# Patient Record
Sex: MALE | Race: WHITE | NOT HISPANIC OR LATINO | URBAN - METROPOLITAN AREA
[De-identification: names, ages, dates, MRNs, and addresses within clinical notes are randomized per-mention and may not be internally consistent; named-entity substitution may affect disease eponyms.]

---

## 2017-03-31 ENCOUNTER — RECORDS - HEALTHEAST (OUTPATIENT)
Dept: VASCULAR ULTRASOUND | Facility: CLINIC | Age: 77
End: 2017-03-31

## 2017-03-31 ENCOUNTER — OFFICE VISIT - HEALTHEAST (OUTPATIENT)
Dept: VASCULAR SURGERY | Facility: CLINIC | Age: 77
End: 2017-03-31

## 2017-03-31 ENCOUNTER — AMBULATORY - HEALTHEAST (OUTPATIENT)
Dept: VASCULAR SURGERY | Facility: CLINIC | Age: 77
End: 2017-03-31

## 2017-03-31 DIAGNOSIS — Z98.890 OTHER SPECIFIED POSTPROCEDURAL STATES: ICD-10-CM

## 2017-03-31 DIAGNOSIS — I65.23 OCCLUSION AND STENOSIS OF BILATERAL CAROTID ARTERIES: ICD-10-CM

## 2017-03-31 DIAGNOSIS — I65.23 CAROTID STENOSIS, BILATERAL: ICD-10-CM

## 2017-03-31 DIAGNOSIS — Z98.890 H/O CAROTID ENDARTERECTOMY: ICD-10-CM

## 2017-03-31 RX ORDER — METOPROLOL TARTRATE 25 MG/1
TABLET, FILM COATED ORAL
Refills: 0 | Status: SHIPPED | COMMUNITY
Start: 2017-02-16

## 2017-03-31 ASSESSMENT — MIFFLIN-ST. JEOR: SCORE: 1448.35

## 2018-03-01 ENCOUNTER — RECORDS - HEALTHEAST (OUTPATIENT)
Dept: LAB | Facility: CLINIC | Age: 78
End: 2018-03-01

## 2018-03-01 LAB
ANION GAP SERPL CALCULATED.3IONS-SCNC: 9 MMOL/L (ref 5–18)
BUN SERPL-MCNC: 17 MG/DL (ref 8–28)
CALCIUM SERPL-MCNC: 10.1 MG/DL (ref 8.5–10.5)
CHLORIDE BLD-SCNC: 103 MMOL/L (ref 98–107)
CHOLEST SERPL-MCNC: 194 MG/DL
CO2 SERPL-SCNC: 26 MMOL/L (ref 22–31)
CREAT SERPL-MCNC: 1.34 MG/DL (ref 0.7–1.3)
FASTING STATUS PATIENT QL REPORTED: NO
GFR SERPL CREATININE-BSD FRML MDRD: 52 ML/MIN/1.73M2
GLUCOSE BLD-MCNC: 107 MG/DL (ref 70–125)
HDLC SERPL-MCNC: 47 MG/DL
LDLC SERPL CALC-MCNC: 112 MG/DL
POTASSIUM BLD-SCNC: 5.2 MMOL/L (ref 3.5–5)
SODIUM SERPL-SCNC: 138 MMOL/L (ref 136–145)
TRIGL SERPL-MCNC: 177 MG/DL

## 2018-06-28 ENCOUNTER — HOME CARE/HOSPICE - HEALTHEAST (OUTPATIENT)
Dept: HOME HEALTH SERVICES | Facility: HOME HEALTH | Age: 78
End: 2018-06-28

## 2018-06-30 ENCOUNTER — HOME CARE/HOSPICE - HEALTHEAST (OUTPATIENT)
Dept: HOME HEALTH SERVICES | Facility: HOME HEALTH | Age: 78
End: 2018-06-30

## 2018-07-02 ENCOUNTER — RECORDS - HEALTHEAST (OUTPATIENT)
Dept: LAB | Facility: CLINIC | Age: 78
End: 2018-07-02

## 2018-07-02 LAB
ERYTHROCYTE [SEDIMENTATION RATE] IN BLOOD BY WESTERGREN METHOD: 3 MM/HR (ref 0–15)
TSH SERPL DL<=0.005 MIU/L-ACNC: 3.87 UIU/ML (ref 0.3–5)
VIT B12 SERPL-MCNC: 769 PG/ML (ref 213–816)

## 2018-07-03 LAB — 25(OH)D3 SERPL-MCNC: 31.2 NG/ML (ref 30–80)

## 2018-07-10 ENCOUNTER — OFFICE VISIT - HEALTHEAST (OUTPATIENT)
Dept: GERIATRICS | Facility: CLINIC | Age: 78
End: 2018-07-10

## 2018-07-10 DIAGNOSIS — J44.9 COPD (CHRONIC OBSTRUCTIVE PULMONARY DISEASE) (H): ICD-10-CM

## 2018-07-10 DIAGNOSIS — I63.9 CVA (CEREBRAL VASCULAR ACCIDENT) (H): ICD-10-CM

## 2018-07-10 DIAGNOSIS — I10 HTN (HYPERTENSION): ICD-10-CM

## 2018-07-10 DIAGNOSIS — R53.1 WEAKNESS: ICD-10-CM

## 2018-07-11 ENCOUNTER — OFFICE VISIT - HEALTHEAST (OUTPATIENT)
Dept: GERIATRICS | Facility: CLINIC | Age: 78
End: 2018-07-11

## 2018-07-11 ENCOUNTER — AMBULATORY - HEALTHEAST (OUTPATIENT)
Dept: ADMINISTRATIVE | Facility: CLINIC | Age: 78
End: 2018-07-11

## 2018-07-11 ENCOUNTER — RECORDS - HEALTHEAST (OUTPATIENT)
Dept: LAB | Facility: CLINIC | Age: 78
End: 2018-07-11

## 2018-07-11 DIAGNOSIS — I10 HTN (HYPERTENSION): ICD-10-CM

## 2018-07-11 DIAGNOSIS — I63.412 EMBOLIC STROKE INVOLVING LEFT MIDDLE CEREBRAL ARTERY (H): ICD-10-CM

## 2018-07-11 DIAGNOSIS — K59.00 CONSTIPATION: ICD-10-CM

## 2018-07-11 DIAGNOSIS — J44.9 COPD (CHRONIC OBSTRUCTIVE PULMONARY DISEASE) (H): ICD-10-CM

## 2018-07-11 DIAGNOSIS — Z71.89 ADVANCED DIRECTIVES, COUNSELING/DISCUSSION: ICD-10-CM

## 2018-07-11 RX ORDER — ATORVASTATIN CALCIUM 40 MG/1
40 TABLET, FILM COATED ORAL AT BEDTIME
Status: SHIPPED | COMMUNITY
Start: 2018-07-11

## 2018-07-11 RX ORDER — OXYMETAZOLINE HYDROCHLORIDE 0.05 G/100ML
SPRAY NASAL
Status: SHIPPED | COMMUNITY
Start: 2018-07-11

## 2018-07-11 RX ORDER — ACETAMINOPHEN 325 MG/1
650 TABLET ORAL EVERY 6 HOURS PRN
Status: SHIPPED | COMMUNITY
Start: 2018-07-11

## 2018-07-11 RX ORDER — CLOPIDOGREL BISULFATE 75 MG/1
75 TABLET ORAL DAILY
Status: SHIPPED | COMMUNITY
Start: 2018-07-11

## 2018-07-12 ENCOUNTER — OFFICE VISIT - HEALTHEAST (OUTPATIENT)
Dept: GERIATRICS | Facility: CLINIC | Age: 78
End: 2018-07-12

## 2018-07-12 DIAGNOSIS — I10 HTN (HYPERTENSION): ICD-10-CM

## 2018-07-12 DIAGNOSIS — J44.9 COPD (CHRONIC OBSTRUCTIVE PULMONARY DISEASE) (H): ICD-10-CM

## 2018-07-12 DIAGNOSIS — K59.00 CONSTIPATION: ICD-10-CM

## 2018-07-12 DIAGNOSIS — I63.412 EMBOLIC STROKE INVOLVING LEFT MIDDLE CEREBRAL ARTERY (H): ICD-10-CM

## 2018-07-12 LAB
ANION GAP SERPL CALCULATED.3IONS-SCNC: 10 MMOL/L (ref 5–18)
BUN SERPL-MCNC: 22 MG/DL (ref 8–28)
CALCIUM SERPL-MCNC: 9.3 MG/DL (ref 8.5–10.5)
CHLORIDE BLD-SCNC: 96 MMOL/L (ref 98–107)
CO2 SERPL-SCNC: 24 MMOL/L (ref 22–31)
CREAT SERPL-MCNC: 1.2 MG/DL (ref 0.7–1.3)
ERYTHROCYTE [DISTWIDTH] IN BLOOD BY AUTOMATED COUNT: 12 % (ref 11–14.5)
GFR SERPL CREATININE-BSD FRML MDRD: 59 ML/MIN/1.73M2
GLUCOSE BLD-MCNC: 105 MG/DL (ref 70–125)
HCT VFR BLD AUTO: 41.3 % (ref 40–54)
HGB BLD-MCNC: 14.3 G/DL (ref 14–18)
MAGNESIUM SERPL-MCNC: 2.1 MG/DL (ref 1.8–2.6)
MCH RBC QN AUTO: 31.6 PG (ref 27–34)
MCHC RBC AUTO-ENTMCNC: 34.6 G/DL (ref 32–36)
MCV RBC AUTO: 91 FL (ref 80–100)
PLATELET # BLD AUTO: 238 THOU/UL (ref 140–440)
PMV BLD AUTO: 9.7 FL (ref 8.5–12.5)
POTASSIUM BLD-SCNC: 3.6 MMOL/L (ref 3.5–5)
RBC # BLD AUTO: 4.53 MILL/UL (ref 4.4–6.2)
SODIUM SERPL-SCNC: 130 MMOL/L (ref 136–145)
WBC: 7.2 THOU/UL (ref 4–11)

## 2018-07-13 LAB — 25(OH)D3 SERPL-MCNC: 32.7 NG/ML (ref 30–80)

## 2018-07-17 ENCOUNTER — OFFICE VISIT - HEALTHEAST (OUTPATIENT)
Dept: GERIATRICS | Facility: CLINIC | Age: 78
End: 2018-07-17

## 2018-07-17 DIAGNOSIS — I10 HTN (HYPERTENSION): ICD-10-CM

## 2018-07-17 DIAGNOSIS — J44.9 COPD (CHRONIC OBSTRUCTIVE PULMONARY DISEASE) (H): ICD-10-CM

## 2018-07-17 DIAGNOSIS — I63.412 EMBOLIC STROKE INVOLVING LEFT MIDDLE CEREBRAL ARTERY (H): ICD-10-CM

## 2018-07-17 DIAGNOSIS — R53.1 WEAKNESS: ICD-10-CM

## 2018-07-19 ENCOUNTER — OFFICE VISIT - HEALTHEAST (OUTPATIENT)
Dept: GERIATRICS | Facility: CLINIC | Age: 78
End: 2018-07-19

## 2018-07-19 DIAGNOSIS — J44.9 COPD (CHRONIC OBSTRUCTIVE PULMONARY DISEASE) (H): ICD-10-CM

## 2018-07-19 DIAGNOSIS — R53.1 WEAKNESS: ICD-10-CM

## 2018-07-19 DIAGNOSIS — I10 HTN (HYPERTENSION): ICD-10-CM

## 2018-07-19 DIAGNOSIS — I63.412 EMBOLIC STROKE INVOLVING LEFT MIDDLE CEREBRAL ARTERY (H): ICD-10-CM

## 2018-07-25 ENCOUNTER — RECORDS - HEALTHEAST (OUTPATIENT)
Dept: LAB | Facility: CLINIC | Age: 78
End: 2018-07-25

## 2018-07-25 ENCOUNTER — OFFICE VISIT - HEALTHEAST (OUTPATIENT)
Dept: GERIATRICS | Facility: CLINIC | Age: 78
End: 2018-07-25

## 2018-07-25 DIAGNOSIS — R42 DIZZINESS, NONSPECIFIC: ICD-10-CM

## 2018-07-25 DIAGNOSIS — E87.1 HYPONATREMIA: ICD-10-CM

## 2018-07-26 ENCOUNTER — OFFICE VISIT - HEALTHEAST (OUTPATIENT)
Dept: GERIATRICS | Facility: CLINIC | Age: 78
End: 2018-07-26

## 2018-07-26 DIAGNOSIS — J44.9 COPD (CHRONIC OBSTRUCTIVE PULMONARY DISEASE) (H): ICD-10-CM

## 2018-07-26 DIAGNOSIS — E87.1 HYPONATREMIA: ICD-10-CM

## 2018-07-26 DIAGNOSIS — I63.412 EMBOLIC STROKE INVOLVING LEFT MIDDLE CEREBRAL ARTERY (H): ICD-10-CM

## 2018-07-26 DIAGNOSIS — R42 DIZZINESS, NONSPECIFIC: ICD-10-CM

## 2018-07-26 LAB — SODIUM SERPL-SCNC: 134 MMOL/L (ref 136–145)

## 2018-07-30 ENCOUNTER — OFFICE VISIT - HEALTHEAST (OUTPATIENT)
Dept: GERIATRICS | Facility: CLINIC | Age: 78
End: 2018-07-30

## 2018-07-30 DIAGNOSIS — I10 ESSENTIAL HYPERTENSION: ICD-10-CM

## 2018-07-30 DIAGNOSIS — R42 DIZZINESS, NONSPECIFIC: ICD-10-CM

## 2018-07-31 ENCOUNTER — OFFICE VISIT - HEALTHEAST (OUTPATIENT)
Dept: GERIATRICS | Facility: CLINIC | Age: 78
End: 2018-07-31

## 2018-07-31 DIAGNOSIS — R53.1 WEAKNESS: ICD-10-CM

## 2018-07-31 DIAGNOSIS — I10 ESSENTIAL HYPERTENSION: ICD-10-CM

## 2018-07-31 DIAGNOSIS — I63.412 EMBOLIC STROKE INVOLVING LEFT MIDDLE CEREBRAL ARTERY (H): ICD-10-CM

## 2018-07-31 DIAGNOSIS — R42 DIZZINESS, NONSPECIFIC: ICD-10-CM

## 2018-08-01 ENCOUNTER — OFFICE VISIT - HEALTHEAST (OUTPATIENT)
Dept: GERIATRICS | Facility: CLINIC | Age: 78
End: 2018-08-01

## 2018-08-01 DIAGNOSIS — I63.412 EMBOLIC STROKE INVOLVING LEFT MIDDLE CEREBRAL ARTERY (H): ICD-10-CM

## 2018-08-01 DIAGNOSIS — I10 ESSENTIAL HYPERTENSION: ICD-10-CM

## 2018-08-01 DIAGNOSIS — R42 DIZZINESS, NONSPECIFIC: ICD-10-CM

## 2018-08-03 ENCOUNTER — AMBULATORY - HEALTHEAST (OUTPATIENT)
Dept: GERIATRICS | Facility: CLINIC | Age: 78
End: 2018-08-03

## 2019-02-25 ENCOUNTER — COMMUNICATION - HEALTHEAST (OUTPATIENT)
Dept: VASCULAR SURGERY | Facility: CLINIC | Age: 79
End: 2019-02-25

## 2019-02-25 ENCOUNTER — RECORDS - HEALTHEAST (OUTPATIENT)
Dept: LAB | Facility: CLINIC | Age: 79
End: 2019-02-25

## 2019-02-25 LAB
ALBUMIN SERPL-MCNC: 4.2 G/DL (ref 3.5–5)
ALP SERPL-CCNC: 64 U/L (ref 45–120)
ALT SERPL W P-5'-P-CCNC: 32 U/L (ref 0–45)
ANION GAP SERPL CALCULATED.3IONS-SCNC: 7 MMOL/L (ref 5–18)
AST SERPL W P-5'-P-CCNC: 18 U/L (ref 0–40)
BILIRUB SERPL-MCNC: 0.5 MG/DL (ref 0–1)
BUN SERPL-MCNC: 21 MG/DL (ref 8–28)
CALCIUM SERPL-MCNC: 10 MG/DL (ref 8.5–10.5)
CHLORIDE BLD-SCNC: 102 MMOL/L (ref 98–107)
CHOLEST SERPL-MCNC: 152 MG/DL
CO2 SERPL-SCNC: 26 MMOL/L (ref 22–31)
CREAT SERPL-MCNC: 1.34 MG/DL (ref 0.7–1.3)
FASTING STATUS PATIENT QL REPORTED: NO
GFR SERPL CREATININE-BSD FRML MDRD: 51 ML/MIN/1.73M2
GLUCOSE BLD-MCNC: 84 MG/DL (ref 70–125)
HDLC SERPL-MCNC: 51 MG/DL
LDLC SERPL CALC-MCNC: 62 MG/DL
POTASSIUM BLD-SCNC: 5.2 MMOL/L (ref 3.5–5)
PROT SERPL-MCNC: 6.6 G/DL (ref 6–8)
SODIUM SERPL-SCNC: 135 MMOL/L (ref 136–145)
TRIGL SERPL-MCNC: 196 MG/DL

## 2021-05-28 ENCOUNTER — RECORDS - HEALTHEAST (OUTPATIENT)
Dept: ADMINISTRATIVE | Facility: CLINIC | Age: 81
End: 2021-05-28

## 2021-05-30 VITALS — WEIGHT: 180 LBS | BODY MASS INDEX: 29.99 KG/M2 | HEIGHT: 65 IN

## 2021-06-01 VITALS — BODY MASS INDEX: 29.12 KG/M2 | WEIGHT: 175 LBS

## 2021-06-01 VITALS — BODY MASS INDEX: 29.05 KG/M2 | WEIGHT: 174.6 LBS

## 2021-06-01 VITALS — WEIGHT: 174.2 LBS | BODY MASS INDEX: 28.99 KG/M2

## 2021-06-01 VITALS — WEIGHT: 175 LBS | BODY MASS INDEX: 29.12 KG/M2

## 2021-06-01 VITALS — WEIGHT: 174 LBS | BODY MASS INDEX: 28.96 KG/M2

## 2021-06-01 VITALS — BODY MASS INDEX: 28.96 KG/M2 | WEIGHT: 174 LBS

## 2021-06-05 ENCOUNTER — RECORDS - HEALTHEAST (OUTPATIENT)
Dept: VASCULAR SURGERY | Facility: CLINIC | Age: 81
End: 2021-06-05

## 2021-06-05 DIAGNOSIS — I65.29 OCCLUSION AND STENOSIS OF CAROTID ARTERY: ICD-10-CM

## 2021-06-05 DIAGNOSIS — I65.8 OCCLUSION AND STENOSIS OF MULTIPLE AND BILATERAL PRECEREBRAL ARTERIES: ICD-10-CM

## 2021-06-09 NOTE — PROGRESS NOTES
Vascular surgery: This 76-year-old male comes in following his right carotid endarterectomy 3 years ago.  An ultrasound was performed for surveillance purposes today and this showed no significant disease on either side and no other abnormalities.  The patient's incision is well-healed.  There is  Carotid upstroke and no residual bruit is heard.    Patient advised to continue with his current regimen, including one baby aspirin daily.    I think a follow-up visit in 2 years with a surveillance carotid ultrasound then would be appropriate and patient was so advised.

## 2021-06-09 NOTE — PROGRESS NOTES
Returning patient, bilateral carotid stenosis, patient is s/p right CEA done 6/2014. Last carotid US done 09/04/2015 which showed no significant stenosis. Patient now reports swelling and pain near surgical site, has carotid US prior to appt.

## 2021-06-16 PROBLEM — E87.1 HYPONATREMIA: Status: ACTIVE | Noted: 2018-07-25

## 2021-06-16 PROBLEM — I63.412 EMBOLIC STROKE INVOLVING LEFT MIDDLE CEREBRAL ARTERY (H): Status: ACTIVE | Noted: 2018-07-11

## 2021-06-16 PROBLEM — J44.9 COPD (CHRONIC OBSTRUCTIVE PULMONARY DISEASE) (H): Status: ACTIVE | Noted: 2018-07-11

## 2021-06-16 PROBLEM — Z71.89 ADVANCED DIRECTIVES, COUNSELING/DISCUSSION: Status: ACTIVE | Noted: 2018-07-11

## 2021-06-16 PROBLEM — K59.00 CONSTIPATION: Status: ACTIVE | Noted: 2018-07-11

## 2021-06-16 PROBLEM — R42 DIZZINESS, NONSPECIFIC: Status: ACTIVE | Noted: 2018-07-25

## 2021-06-19 NOTE — PROGRESS NOTES
[facility]      Code Status:  FULL CODE  Visit Type: Review Of Multiple Medical Conditions     Facility:  [facility] [[id number]]           History of Present Illness: Al Dixon is a 77 y.o. male who was admitted to the TCU as a transfer from the hospital.  This is an elderly gentleman who presented with weakness and speech impairment to the hospital.  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  He underwent TPA and is doing quite well with no significant residual weakness  MRI showed no ICH so aspirin was resumed.  Left internal carotid artery was also opened on the CTA  He has other medical problems including COPD coronary disease hypertension which were stable  As per neurology's recommendation he will be on aspirin and Plavix for 90 days after which he will resume just Plavix daily they also recommended optimization of his medical management including better control of blood pressure ;cholesterol  His metoprolol was increased to 75 mg twice daily due to elevated blood pressures.  He has been reporting some dizziness and lightheadedness  Concerned about his facial drooping on the left side along with significant gait instability and balance impairments.  Recheck labs of shown hyponatremia with a sodium 130 but otherwise appeared to be stable.  I did have a discussion with his physical therapist who believes that this is not orthostatic drop it was related most likely to his CVA  He is working in therapy and doing well however continues to complain of some heaviness of his face    Past Medical History:   Diagnosis Date     COPD (chronic obstructive pulmonary disease) (H)      Embolic stroke involving left middle cerebral artery (H)      Hypertension      Renal insufficiency      Past Surgical History:   Procedure Laterality Date     BACK SURGERY      L5-S1      CHOLECYSTECTOMY       FL  THROMBOENDARTECTMY NECK,NECK INCIS Right 6/26/2014    Procedure: CAROTID ENDARTERECTOMY, RIGHT;  Surgeon: Tr Nolan MD;  Location: [facility] Main OR;  Service: General     No family history on file.  Social History     Social History     Marital status:      Spouse name: N/A     Number of children: N/A     Years of education: N/A     Occupational History     Not on file.     Social History Main Topics     Smoking status: Former Smoker     Smokeless tobacco: Not on file     Alcohol use No     Drug use: No     Sexual activity: Not on file     Other Topics Concern     Not on file     Social History Narrative     Current Outpatient Prescriptions   Medication Sig Dispense Refill     acetaminophen (TYLENOL) 325 MG tablet Take 650 mg by mouth every 6 (six) hours as needed for pain.       albuterol (PROVENTIL HFA;VENTOLIN HFA) 90 mcg/actuation inhaler Inhale 1-2 puffs every 4 (four) hours as needed for wheezing.        aspirin 81 MG tablet Take 81 mg by mouth daily.       atorvastatin (LIPITOR) 40 MG tablet Take 40 mg by mouth at bedtime.       clopidogrel (PLAVIX) 75 mg tablet Take 75 mg by mouth daily.       metoprolol tartrate (LOPRESSOR) 25 MG tablet TAKE 3 TABLETS (75  MG) BY MOUTH TWICE DAILY  0     psyllium husk, with sugar, (FIBER, PSYLLIUM HUSK/SUGAR,) 3.4 gram/7 gram Powd Take by mouth. 1 scoop daily       ranitidine (ZANTAC) 150 MG tablet Take 150 mg by mouth 2 (two) times a day.       No current facility-administered medications for this visit.      Allergies   Allergen Reactions     Augmentin [Amoxicillin-Pot Clavulanate]      GI upset     Dulera [Mometasone-Formoterol]      shaking     Penicillins      Prednisone      Symbicort [Budesonide-Formoterol]      bachache       Review of Systems:    Constitutional: Negative.  Negative for fever, chills, HAS activity change, appetite change and fatigue.   HENT: Negative for congestion and facial swelling.    Eyes: Negative for photophobia, redness and  visual disturbance.   Respiratory: Negative for cough and chest tightness.    Cardiovascular: Negative for chest pain, palpitations and leg swelling.   Gastrointestinal: Negative for nausea, diarrhea, he has constipation, NO blood in stool and abdominal distention.   Genitourinary: Negative.    Musculoskeletal: Negative.    Reporting left-sided weakness with speech difficulties  Skin: Negative.    Neurological: Negative for dizziness, tremors, syncope, HAS weakness, light-headedness and headaches.   Hematological: Does not bruise/bleed easily.   Psychiatric/Behavioral: Negative.      Vitals:    07/19/18 1109   BP: 173/84   Pulse: 78   Temp: 98  F (36.7  C)       Physical Exam:    GENERAL: no acute distress. Cooperative in conversation.   HEENT: pupils are equal, round and reactive. Oral mucosa is moist and intact.  RESP:Chest symmetric. Regular respiratory rate. No stridor.  CVS: S1S2  ABD: Nondistended, soft.  EXTREMITIES: No lower extremity edema.  Strength is equal in both upper and lower extremities with no focal deficit noted he does have some speech difficulties  NEURO: non focal. Alert and oriented x3.   PSYCH: within normal limits. No depression or anxiety.  SKIN: warm dry intact       Labs:    Recent Results (from the past 240 hour(s))   HM2(CBC w/o Differential)   Result Value Ref Range    WBC 7.2 4.0 - 11.0 thou/uL    RBC 4.53 4.40 - 6.20 mill/uL    Hemoglobin 14.3 14.0 - 18.0 g/dL    Hematocrit 41.3 40.0 - 54.0 %    MCV 91 80 - 100 fL    MCH 31.6 27.0 - 34.0 pg    MCHC 34.6 32.0 - 36.0 g/dL    RDW 12.0 11.0 - 14.5 %    Platelets 238 140 - 440 thou/uL    MPV 9.7 8.5 - 12.5 fL   Vitamin D, Total (25-Hydroxy)   Result Value Ref Range    Vitamin D, Total (25-Hydroxy) 32.7 30.0 - 80.0 ng/mL   Basic Metabolic Panel   Result Value Ref Range    Sodium 130 (L) 136 - 145 mmol/L    Potassium 3.6 3.5 - 5.0 mmol/L    Chloride 96 (L) 98 - 107 mmol/L    CO2 24 22 - 31 mmol/L    Anion Gap, Calculation 10 5 - 18 mmol/L     Glucose 105 70 - 125 mg/dL    Calcium 9.3 8.5 - 10.5 mg/dL    BUN 22 8 - 28 mg/dL    Creatinine 1.20 0.70 - 1.30 mg/dL    GFR MDRD Af Amer >60 >60 mL/min/1.73m2    GFR MDRD Non Af Amer 59 (L) >60 mL/min/1.73m2   Magnesium   Result Value Ref Range    Magnesium 2.1 1.8 - 2.6 mg/dL     Hemoglobin 13 white count 8.7 platelets 206.  BUN 15 creatinine 1.2  MRI brain showed a 3.5 and 2 mm acute infarct in the left lateral medullary with chronic cortical infarct in the left superior parietal lobule.  Mild presumed chronic small vessel ischemic changes and moderate generalized volume loss  CT of the head and neck with CTA shows distal left vertebral artery is occluded with occlusion of the left vertebral artery ostium and decreased opacification the left V1 and proximal left P2 segments no high-grade stenoses of the bilateral common or internal carotid arteries noted with only 30% stenosis of proximal left internal carotid artery  TTE  Normal LV size and systolic function moderate LA dilatation no significant valvular abnormalities  Negative bubble study  Ejection fraction is about 70%      Assessment/Plan:    Left middle cerebral artery embolic stroke related to cardiac sources  Left internal carotid artery was open on the CTA  He underwent TPN .is doing quite well.  MRI showed no intracranial hemorrhage aspirin has been resumed on discharge based on neurology's recommendation is on aspirin and Plavix for 90 days subsequently he will  Just take Plavix indefinitely from then on.  Right-sided weakness with aphasia.  Currently seems to be communicating well and has no significant weakness he is weightbearing as tolerated  HTN-blood pressures are significantly elevated.  He is on metoprolol monitor and adjust medications.  Dosage increased to 75 twice daily  COPD-patient only once Spiriva along with albuterol as needed.  Also takes an oral steroid inhaler  CKD-monitor BMPs in the TCU.  CONSTIPATION-he is requesting prune  juice he has not had a BM for 4 days  WEAKNESS-discharged to the TCU for strengthening and rehab denying any significant weakness but does have speech difficulties  Continue with his care plan monitor blood pressures closely and adjust blood pressure medications as needed  He continues to have significant difficulty with balance impairments though he does not have any specific weakness per se and is working in therapy.  Denying any worsening aphasia or speech difficulty still  Recheck labs are stable he continues to have persistent dizziness and lightheadedness but denies it is positional orthostatic blood pressures however did not show any drop labs are mostly stable other than the low sodium.  Suspect this is most likely due to recent CVA and I did talk to his therapist and they have suspected the same thing so continue to monitor closely      Electronically signed by: JACLYN Reyes  This progress note was completed using Dragon software and there may be grammatical errors.

## 2021-06-19 NOTE — PROGRESS NOTES
[facility]      Code Status:  FULL CODE  Visit Type: H & P     Facility:  [facility]3] [[id number]]           History of Present Illness: Al Dixon is a 77 y.o. male who was admitted to the TCU as a transfer from the hospital.  This is an elderly gentleman who presented with weakness and speech impairment to the hospital.  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  He underwent TPA and is doing quite well with no significant residual weakness  MRI showed no ICH so aspirin was resumed.  Left internal carotid artery was also opened on the CTA  He has other medical problems including COPD coronary disease hypertension which were stable  As per neurology's recommendation he will be on aspirin and Plavix for 90 days after which he will resume just Plavix daily they also recommended optimization of his medical management including better control of blood pressure ;cholesterol    Past Medical History:   Diagnosis Date     COPD (chronic obstructive pulmonary disease) (H)      Hypertension      Past Surgical History:   Procedure Laterality Date     CHOLECYSTECTOMY       PA THROMBOENDARTECTMY NECK,NECK INCIS Right 6/26/2014    Procedure: CAROTID ENDARTERECTOMY, RIGHT;  Surgeon: Tr Nolan MD;  Location: [facility];  Service: General     No family history on file.  Social History     Social History     Marital status:      Spouse name: N/A     Number of children: N/A     Years of education: N/A     Occupational History     Not on file.     Social History Main Topics     Smoking status: Former Smoker     Smokeless tobacco: Not on file     Alcohol use No     Drug use: No     Sexual activity: Not on file     Other Topics Concern     Not on file     Social History Narrative     Current Outpatient Prescriptions   Medication Sig Dispense Refill     albuterol (PROVENTIL HFA;VENTOLIN HFA) 90  mcg/actuation inhaler Inhale 2 puffs every 6 (six) hours as needed for wheezing.       amLODIPine (NORVASC) 2.5 MG tablet Take 2.5 mg by mouth daily.       ANORO ELLIPTA 62.5-25 mcg/actuation inhaler INHALE 1 PUFF BY INHALATION ROUTE EVERY DAY AT THE SAME TIME EACH DAY  11     aspirin 81 MG tablet Take 81 mg by mouth daily.       FLAXSEED OIL ORAL Take 1 tablet by mouth daily.       glucosamine-chondroitin 500-400 mg tablet Take 1 tablet by mouth daily.       ibuprofen (ADVIL,MOTRIN) 200 MG tablet Take 200 mg by mouth every 6 (six) hours as needed for pain.       metoprolol tartrate (LOPRESSOR) 25 MG tablet TAKE 1 TABLET BY MOUTH TWICE DAILY  0     multivitamin (MULTIVITAMIN) per tablet Take 1 tablet by mouth daily.       prednisoLONE acetate (PRED-FORTE) 1 % ophthalmic suspension USE 1 DROP IN RIGHT EYE 4 TIMES A DAY. START 1 DAY PRIOR TO SURGERY AND STAY ON UNTIL DIRECTED  1     tiotropium (SPIRIVA) 18 mcg inhalation capsule Place 18 mcg into inhaler and inhale daily.       VIGAMOX 0.5 % ophthalmic solution INSTILL 1 DROP INTO RIGHT EYE 4 TIMES DAILY. START 1 DAY PRIOR TO SURGERY & USE UNTIL BOTTLE IS GONE  1     No current facility-administered medications for this visit.      Allergies   Allergen Reactions     Augmentin [Amoxicillin-Pot Clavulanate]      GI upset     Dulera [Mometasone-Formoterol]      shaking     Symbicort [Budesonide-Formoterol]      bachache         Review of Systems:    Constitutional: Negative.  Negative for fever, chills, HAS activity change, appetite change and fatigue.   HENT: Negative for congestion and facial swelling.    Eyes: Negative for photophobia, redness and visual disturbance.   Respiratory: Negative for cough and chest tightness.    Cardiovascular: Negative for chest pain, palpitations and leg swelling.   Gastrointestinal: Negative for nausea, diarrhea, he has constipation, NO blood in stool and abdominal distention.   Genitourinary: Negative.    Musculoskeletal: Negative.     Reporting left-sided weakness with speech difficulties  Skin: Negative.    Neurological: Negative for dizziness, tremors, syncope, weakness, light-headedness and headaches.   Hematological: Does not bruise/bleed easily.   Psychiatric/Behavioral: Negative.      Vitals:    07/10/18 1815   BP: 180/78   Pulse: 85   Temp: 98  F (36.7  C)       Physical Exam:    GENERAL: no acute distress. Cooperative in conversation.   HEENT: pupils are equal, round and reactive. Oral mucosa is moist and intact.  RESP:Chest symmetric. Regular respiratory rate. No stridor.  CVS: S1S2  ABD: Nondistended, soft.  EXTREMITIES: No lower extremity edema.  Strength is equal in both upper and lower extremities with no focal deficit noted he does have some speech difficulties  NEURO: non focal. Alert and oriented x3.   PSYCH: within normal limits. No depression or anxiety.  SKIN: warm dry intact       Labs:    Recent Results (from the past 240 hour(s))   Vitamin B12   Result Value Ref Range    Vitamin B-12 769 213 - 816 pg/mL   Vitamin D, Total (25-Hydroxy)   Result Value Ref Range    Vitamin D, Total (25-Hydroxy) 31.2 30.0 - 80.0 ng/mL   Thyroid Stimulating Hormone (TSH)   Result Value Ref Range    TSH 3.87 0.30 - 5.00 uIU/mL   Erythrocyte Sedimentation Rate   Result Value Ref Range    Sed Rate 3 0 - 15 mm/hr     Hemoglobin 13 white count 8.7 platelets 206.  BUN 15 creatinine 1.2  MRI brain showed a 3.5 and 2 mm acute infarct in the left lateral medullary with chronic cortical infarct in the left superior parietal lobule.  Mild presumed chronic small vessel ischemic changes and moderate generalized volume loss  CT of the head and neck with CTA shows distal left vertebral artery is occluded with occlusion of the left vertebral artery ostium and decreased opacification the left V1 and proximal left P2 segments no high-grade stenoses of the bilateral common or internal carotid arteries noted with only 30% stenosis of proximal left internal carotid  artery  TTE  Normal LV size and systolic function moderate LA dilatation no significant valvular abnormalities  Negative bubble study  Ejection fraction is about 70%        Assessment/Plan:    Left middle cerebral artery embolic stroke related to cardiac sources  Left internal carotid artery was open on the CTA  He underwent TPN is doing quite well.  MRI showed no intracranial hemorrhage aspirin has been resumed on discharge based on neurology's recommendation is on aspirin and Plavix for 90 days subsequently he will  Just take Plavix indefinitely from then on  Right-sided weakness with aphasia.  Currently seems to be communicating well and has no significant weakness he is weightbearing as tolerated  HTN-blood pressures are significantly elevated.  He is on amlodipine and metoprolol monitor and adjust medications  COPD-patient only once Spiriva along with albuterol as needed.  Also takes an oral steroid inhaler  CKD-monitor BMPs in the TCU.  CONSTIPATION-he is requesting prune juice he has not had a BM for 4 days  WEAKNESS-discharged to the TCU for strengthening and rehab denying any significant weakness but does have speech difficulties  Continue with his care plan monitor blood pressures closely and adjust blood pressure medications as needed    Total time spent was 45 minutes, more than half of it was in face-to-face counseling regarding disease state, treatment, side effects, documentation, review of clinical data and coordination of care    Electronically signed by: JACLYN Reyes  This progress note was completed using Dragon software and there may be grammatical errors.

## 2021-06-19 NOTE — PROGRESS NOTES
[facility]      NAME:  Al Dixon             :  1940    MRN: [id number]    CODE STATUS:  FULL CODE    FACILITY: [facility] ROOMING ACTIVITY QUESTION         CHIEF COMPLAIN/REASON FOR VISIT:  Chief Complaint   Patient presents with     Problem Visit       HISTORY OF PRESENT ILLNESS: Al Dixon is a 77 y.o. male being seen at the request of the nurses review of htn.  Pt admitted to the TCU after a stay at [facility].  Per patient's report he originally presented to [facility] after he presented with weakness and speech impairment.  Imaging revealed that he had a left middle cerebral artery embolic stroke probable cause cardiac source.  The patient he reports he was transferred to [facility].  He underwent tPA and did well with no residual weakness since then.  He has quite a hefty handgrip.  He had no ICH on his MRI so ASA was resumed.  Is also on Plavix per neurology recommendation and that will be in 90 days and we will monitor.  He is seen today in his room to review his pulse and he wishes to be full code.  He does have a bit of a left side to on his eye however as mentioned residual strength and  are equal very strong denies weakness in legs.  Of note he has been having several high blood pressures as well today is 185/96, he has been systolically over 200 in the past day or so as well.  He is on metoprolol tartrate 25 mg take 2 tablets twice daily, in lieu of higher blood pressures and going to increase that to 75 mg p.o. twice daily and staff will continue to monitor.      Today patient is seen sitting up on the side of his bed eating breakfast, he reports that his dizziness with associated nausea has greatly improved, he denies any nausea or dizziness over the weekend.  His main concern today is if he is able to drive when he goes home.  He is also concerned about his blood pressures which have been mildly elevated since he has been at the facility,  per review they have been between 130 and 170/78.  He is on metoprolol 75 twice a day, and his pulse is 54-62.  He is denying any major symptoms today denies headache, dizziness, abdominal pain, nausea, constipation, no urinary symptoms.  He continues to work with therapy.    Allergies   Allergen Reactions     Augmentin [Amoxicillin-Pot Clavulanate]      GI upset     Dulera [Mometasone-Formoterol]      shaking     Penicillins      Prednisone      Symbicort [Budesonide-Formoterol]      bachache   :     Current Outpatient Prescriptions   Medication Sig     acetaminophen (TYLENOL) 325 MG tablet Take 650 mg by mouth every 6 (six) hours as needed for pain.     albuterol (PROVENTIL HFA;VENTOLIN HFA) 90 mcg/actuation inhaler Inhale 1-2 puffs every 4 (four) hours as needed for wheezing.      aspirin 81 MG tablet Take 81 mg by mouth daily.     atorvastatin (LIPITOR) 40 MG tablet Take 40 mg by mouth at bedtime.     clopidogrel (PLAVIX) 75 mg tablet Take 75 mg by mouth daily.     metoprolol tartrate (LOPRESSOR) 25 MG tablet TAKE 3 TABLETS (75  MG) BY MOUTH TWICE DAILY     psyllium husk, with sugar, (FIBER, PSYLLIUM HUSK/SUGAR,) 3.4 gram/7 gram Powd Take by mouth. 1 scoop daily     ranitidine (ZANTAC) 150 MG tablet Take 150 mg by mouth 2 (two) times a day.         REVIEW OF SYSTEMS:    Currently, no fever, chills, or rigors. Does not have any visual or hearing problems. Denies any chest pain, headaches, palpitations, lightheadedness, shortness of breath, or cough. Appetite is good. Denies any GERD symptoms. Denies any difficulty with swallowing, nausea, or vomiting.  Denies any abdominal pain, diarrhea, has constipation. Denies any urinary symptoms. No insomnia. No active bleeding. No rash.  Fatigue.      PHYSICAL EXAMINATION:  There were no vitals filed for this visit.      GENERAL: Awake, Alert, oriented x3, not in any form of acute distress, answers questions appropriately, follows simple commands, conversant  HEENT: Head is  normocephalic. No evidence of trauma. Ears: No acute purulent discharge. Eyes: Sclera anicteric.  mucosa and septum.   CHEST: No tenderness or deformity.  LUNG: No dyspnea.  BACK: No kyphosis of the thoracic spine. Symmetric.  CVS: There is good S1  S2,  rhythm is regular.  ABDOMEN: Globular and soft, nontender to palpation.  EXTREMITIES: Atraumatic. Full range of motion on both upper and lower extremities, there is no tenderness to palpation, no pedal edema.  SKIN: Warm and dry, no erythema noted, no rashes or lesions.  NEUROLOGICAL: The patient is oriented to person, place and time. Strength and sensation are grossly intact. Face is symmetric.      LABS:    Lab Results   Component Value Date    WBC 7.2 07/11/2018    HGB 14.3 07/11/2018    HCT 41.3 07/11/2018    MCV 91 07/11/2018     07/11/2018       Results for orders placed or performed in visit on 07/11/18   Basic Metabolic Panel   Result Value Ref Range    Sodium 130 (L) 136 - 145 mmol/L    Potassium 3.6 3.5 - 5.0 mmol/L    Chloride 96 (L) 98 - 107 mmol/L    CO2 24 22 - 31 mmol/L    Anion Gap, Calculation 10 5 - 18 mmol/L    Glucose 105 70 - 125 mg/dL    Calcium 9.3 8.5 - 10.5 mg/dL    BUN 22 8 - 28 mg/dL    Creatinine 1.20 0.70 - 1.30 mg/dL    GFR MDRD Af Amer >60 >60 mL/min/1.73m2    GFR MDRD Non Af Amer 59 (L) >60 mL/min/1.73m2           No results found for: HGBA1C  Vitamin D, Total (25-Hydroxy)   Date Value Ref Range Status   07/11/2018 32.7 30.0 - 80.0 ng/mL Final     Lab Results   Component Value Date    [id number] 769 07/02/2018       ASSESSMENT/PLAN:  1. Dizziness, nonspecific    2. Essential hypertension      1.  Continue to monitor, patient's symptoms have greatly improved; no interventions at this time.  Continue to work with therapy and consult with MD and PT regarding driving restrictions prior to discharge.  2.  Continue to monitor blood pressure, they are relatively stable around 145-155 with a few outliers, his pulse hovers between 55  and 60 so I do not want to change his metoprolol at this time.  He will be seen cardiology at the end of the month to al after using Holter monitor for 30 days.    Electronically signed by: Anna Vinson CNP    This progress note was completed using Dragon software and there may be grammatical errors.      25 minutes spent of which greater than 75 % was face to face communication with the patient and PT about above plan of care

## 2021-06-19 NOTE — PROGRESS NOTES
[facility]      Code Status:  FULL CODE  Visit Type: Review Of Multiple Medical Conditions     Facility:  [facility] [[id number]]           History of Present Illness: Al Dixon is a 77 y.o. male who was admitted to the TCU as a transfer from the hospital.  This is an elderly gentleman who presented with weakness and speech impairment to the hospital.  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  He underwent TPA and is doing quite well with no significant residual weakness  MRI showed no ICH so aspirin was resumed.  Left internal carotid artery was also opened on the CTA  He has other medical problems including COPD coronary disease hypertension which were stable  As per neurology's recommendation he will be on aspirin and Plavix for 90 days after which he will resume just Plavix daily they also recommended optimization of his medical management including better control of blood pressure ;cholesterol  His metoprolol was increased to 75 mg twice daily due to elevated blood pressures.  He has been reporting some dizziness and lightheadedness  Concerned about his facial drooping on the left side along with significant gait instability and balance impairments.  Recheck labs of shown hyponatremia with a sodium 130 but otherwise appeared to be stable.  I did have a discussion with his physical therapist who believes that this is not orthostatic drop it was related most likely to his CVA    Past Medical History:   Diagnosis Date     COPD (chronic obstructive pulmonary disease) (H)      Embolic stroke involving left middle cerebral artery (H)      Hypertension      Renal insufficiency      Past Surgical History:   Procedure Laterality Date     BACK SURGERY      L5-S1      CHOLECYSTECTOMY       DE THROMBOENDARTECTMY NECK,NECK INCIS Right 6/26/2014    Procedure: CAROTID ENDARTERECTOMY, RIGHT;  Surgeon:  Tr Nolan MD;  Location: [facility] Main OR;  Service: General     No family history on file.  Social History     Social History     Marital status:      Spouse name: N/A     Number of children: N/A     Years of education: N/A     Occupational History     Not on file.     Social History Main Topics     Smoking status: Former Smoker     Smokeless tobacco: Not on file     Alcohol use No     Drug use: No     Sexual activity: Not on file     Other Topics Concern     Not on file     Social History Narrative     Current Outpatient Prescriptions   Medication Sig Dispense Refill     acetaminophen (TYLENOL) 325 MG tablet Take 650 mg by mouth every 6 (six) hours as needed for pain.       albuterol (PROVENTIL HFA;VENTOLIN HFA) 90 mcg/actuation inhaler Inhale 1-2 puffs every 4 (four) hours as needed for wheezing.        aspirin 81 MG tablet Take 81 mg by mouth daily.       atorvastatin (LIPITOR) 40 MG tablet Take 40 mg by mouth at bedtime.       clopidogrel (PLAVIX) 75 mg tablet Take 75 mg by mouth daily.       metoprolol tartrate (LOPRESSOR) 25 MG tablet TAKE 3 TABLETS (75  MG) BY MOUTH TWICE DAILY  0     psyllium husk, with sugar, (FIBER, PSYLLIUM HUSK/SUGAR,) 3.4 gram/7 gram Powd Take by mouth. 1 scoop daily       ranitidine (ZANTAC) 150 MG tablet Take 150 mg by mouth 2 (two) times a day.       No current facility-administered medications for this visit.      Allergies   Allergen Reactions     Augmentin [Amoxicillin-Pot Clavulanate]      GI upset     Dulera [Mometasone-Formoterol]      shaking     Penicillins      Prednisone      Symbicort [Budesonide-Formoterol]      bachache       Review of Systems:    Constitutional: Negative.  Negative for fever, chills, HAS activity change, appetite change and fatigue.   HENT: Negative for congestion and facial swelling.    Eyes: Negative for photophobia, redness and visual disturbance.   Respiratory: Negative for cough and chest tightness.    Cardiovascular: Negative for  chest pain, palpitations and leg swelling.   Gastrointestinal: Negative for nausea, diarrhea, he has constipation, NO blood in stool and abdominal distention.   Genitourinary: Negative.    Musculoskeletal: Negative.    Reporting left-sided weakness with speech difficulties  Skin: Negative.    Neurological: Negative for dizziness, tremors, syncope, HAS weakness, light-headedness and headaches.   Hematological: Does not bruise/bleed easily.   Psychiatric/Behavioral: Negative.      Vitals:    07/17/18 1033   BP: 147/90   Pulse: (!) 56   Temp: 98  F (36.7  C)       Physical Exam:    GENERAL: no acute distress. Cooperative in conversation.   HEENT: pupils are equal, round and reactive. Oral mucosa is moist and intact.  RESP:Chest symmetric. Regular respiratory rate. No stridor.  CVS: S1S2  ABD: Nondistended, soft.  EXTREMITIES: No lower extremity edema.  Strength is equal in both upper and lower extremities with no focal deficit noted he does have some speech difficulties  NEURO: non focal. Alert and oriented x3.   PSYCH: within normal limits. No depression or anxiety.  SKIN: warm dry intact       Labs:    Recent Results (from the past 240 hour(s))   HM2(CBC w/o Differential)   Result Value Ref Range    WBC 7.2 4.0 - 11.0 thou/uL    RBC 4.53 4.40 - 6.20 mill/uL    Hemoglobin 14.3 14.0 - 18.0 g/dL    Hematocrit 41.3 40.0 - 54.0 %    MCV 91 80 - 100 fL    MCH 31.6 27.0 - 34.0 pg    MCHC 34.6 32.0 - 36.0 g/dL    RDW 12.0 11.0 - 14.5 %    Platelets 238 140 - 440 thou/uL    MPV 9.7 8.5 - 12.5 fL   Vitamin D, Total (25-Hydroxy)   Result Value Ref Range    Vitamin D, Total (25-Hydroxy) 32.7 30.0 - 80.0 ng/mL   Basic Metabolic Panel   Result Value Ref Range    Sodium 130 (L) 136 - 145 mmol/L    Potassium 3.6 3.5 - 5.0 mmol/L    Chloride 96 (L) 98 - 107 mmol/L    CO2 24 22 - 31 mmol/L    Anion Gap, Calculation 10 5 - 18 mmol/L    Glucose 105 70 - 125 mg/dL    Calcium 9.3 8.5 - 10.5 mg/dL    BUN 22 8 - 28 mg/dL    Creatinine 1.20  0.70 - 1.30 mg/dL    GFR MDRD Af Amer >60 >60 mL/min/1.73m2    GFR MDRD Non Af Amer 59 (L) >60 mL/min/1.73m2   Magnesium   Result Value Ref Range    Magnesium 2.1 1.8 - 2.6 mg/dL     Hemoglobin 13 white count 8.7 platelets 206.  BUN 15 creatinine 1.2  MRI brain showed a 3.5 and 2 mm acute infarct in the left lateral medullary with chronic cortical infarct in the left superior parietal lobule.  Mild presumed chronic small vessel ischemic changes and moderate generalized volume loss  CT of the head and neck with CTA shows distal left vertebral artery is occluded with occlusion of the left vertebral artery ostium and decreased opacification the left V1 and proximal left P2 segments no high-grade stenoses of the bilateral common or internal carotid arteries noted with only 30% stenosis of proximal left internal carotid artery  TTE  Normal LV size and systolic function moderate LA dilatation no significant valvular abnormalities  Negative bubble study  Ejection fraction is about 70%      Assessment/Plan:    Left middle cerebral artery embolic stroke related to cardiac sources  Left internal carotid artery was open on the CTA  He underwent TPN .is doing quite well.  MRI showed no intracranial hemorrhage aspirin has been resumed on discharge based on neurology's recommendation is on aspirin and Plavix for 90 days subsequently he will  Just take Plavix indefinitely from then on.  Right-sided weakness with aphasia.  Currently seems to be communicating well and has no significant weakness he is weightbearing as tolerated  HTN-blood pressures are significantly elevated.  He is on metoprolol monitor and adjust medications.  Dosage increased to 75 twice daily  COPD-patient only once Spiriva along with albuterol as needed.  Also takes an oral steroid inhaler  CKD-monitor BMPs in the TCU.  CONSTIPATION-he is requesting prune juice he has not had a BM for 4 days  WEAKNESS-discharged to the TCU for strengthening and rehab denying  any significant weakness but does have speech difficulties  Continue with his care plan monitor blood pressures closely and adjust blood pressure medications as needed  He continues to have significant difficulty with balance impairments though he does not have any specific weakness per se and is working in therapy.  Denying any worsening aphasia or speech difficulty still  Recheck labs are stable he continues to have persistent dizziness and lightheadedness but denies it is positional orthostatic blood pressures however did not show any drop labs are mostly stable other than the low sodium.  Suspect this is most likely due to recent CVA and I did talk to his therapist and they have suspected the same thing so continue to monitor closely  Total time spent was 35 minutes, more than half of it was in face-to-face counseling regarding disease state, treatment, side effects, documentation, review of clinical data and coordination of care    Electronically signed by: JACLYN Reyes  This progress note was completed using Dragon software and there may be grammatical errors.

## 2021-06-19 NOTE — PROGRESS NOTES
[facility]      NAME:  Al Dixon             :  1940    MRN: [id number]    CODE STATUS:  FULL CODE    FACILITY: [facility] [[id number]]         CHIEF COMPLAIN/REASON FOR VISIT:  Chief Complaint   Patient presents with     Review Of Multiple Medical Conditions       HISTORY OF PRESENT ILLNESS: Al Dixon is a 77 y.o. male being seen at the request of the nurses for POLST review and medical condition review. Pt admitted to the TCU after a stay at [facility].  Per patient's report he originally presented to [facility] after he presented with weakness and speech impairment.  Imaging revealed that he had a left middle cerebral artery embolic stroke probable cause cardiac source.  The patient he reports he was transferred to [facility].  He underwent tPA and did well with no residual weakness since then.  He has quite a hefty handgrip.  He had no ICH on his MRI so ASA was resumed.  Is also on Plavix per neurology recommendation and that will be in 90 days and we will monitor.  He is seen today in his room to review his pulse and he wishes to be full code.  He does have a bit of a left side to on his eye however as mentioned residual strength and  are equal very strong denies weakness in legs.  Of note he has been having several high blood pressures as well today is 185/96, he has been systolically over 200 in the past day or so as well.  He is on metoprolol tartrate 25 mg take 2 tablets twice daily, in lieu of higher blood pressures and going to increase that to 75 mg p.o. twice daily and staff will continue to monitor.  Is seen sitting up in his room after therapy and does report fatigue, specifically due to his therapy.  He also has concerns of starting to feel a bit nauseated as he has not had a BM in 4 days and feels very full.  He was given Metamucil and senna this a.m.    Allergies   Allergen Reactions     Augmentin [Amoxicillin-Pot  Clavulanate]      GI upset     Dulera [Mometasone-Formoterol]      shaking     Penicillins      Prednisone      Symbicort [Budesonide-Formoterol]      bachache   :     Current Outpatient Prescriptions   Medication Sig     acetaminophen (TYLENOL) 325 MG tablet Take 650 mg by mouth every 6 (six) hours as needed for pain.     albuterol (PROVENTIL HFA;VENTOLIN HFA) 90 mcg/actuation inhaler Inhale 1-2 puffs every 4 (four) hours as needed for wheezing.      aspirin 81 MG tablet Take 81 mg by mouth daily.     atorvastatin (LIPITOR) 40 MG tablet Take 40 mg by mouth at bedtime.     clopidogrel (PLAVIX) 75 mg tablet Take 75 mg by mouth daily.     metoprolol tartrate (LOPRESSOR) 25 MG tablet TAKE 3 TABLETS (75  MG) BY MOUTH TWICE DAILY     psyllium husk, with sugar, (FIBER, PSYLLIUM HUSK/SUGAR,) 3.4 gram/7 gram Powd Take by mouth. 1 scoop daily     ranitidine (ZANTAC) 150 MG tablet Take 150 mg by mouth 2 (two) times a day.         REVIEW OF SYSTEMS:    Currently, no fever, chills, or rigors. Does not have any visual or hearing problems. Denies any chest pain, headaches, palpitations, lightheadedness, dizziness, shortness of breath, or cough. Appetite is good. Denies any GERD symptoms. Denies any difficulty with swallowing, nausea, or vomiting.  Denies any abdominal pain, diarrhea, has constipation. Denies any urinary symptoms. No insomnia. No active bleeding. No rash.  Fatigue.      PHYSICAL EXAMINATION:  Vitals:    07/11/18 1252   BP: (!) 185/96   Pulse: 73   Temp: 97.8  F (36.6  C)   Weight: 174 lb 9.6 oz (79.2 kg)         GENERAL: Awake, Alert, oriented x3, not in any form of acute distress, answers questions appropriately, follows simple commands, conversant  HEENT: Head is normocephalic with normal hair distribution. No evidence of trauma. Ears: No acute purulent discharge. Eyes: Conjunctivae pink with no scleral jaundice. Nose: Normal   mucosa and septum.   CHEST: No tenderness or deformity, no crepitus  LUNG: Clear to  auscultation with good chest expansion. There are no crackles or wheezes, normal AP diameter.  BACK: No kyphosis of the thoracic spine. Symmetric, no curvature, ROM normal, no CVA tenderness, no spinal tenderness   CVS: There is good S1  S2,  rhythm is regular.  ABDOMEN: Globular and soft, nontender to palpation, non distended, no masses, no organomegaly, good bowel sounds, no rebound or guarding, no peritoneal signs.   EXTREMITIES: Atraumatic. Full range of motion on both upper and lower extremities, there is no tenderness to palpation, no pedal edema, no cyanosis or clubbing, no calf tenderness, normal cap refill, no joint swelling.  SKIN: Warm and dry, no erythema noted, no rashes or lesions.  NEUROLOGICAL: The patient is oriented to person, place and time. Strength and sensation are grossly intact. Face is symmetric.            LABS:    Lab Results   Component Value Date    WBC 7.2 06/28/2018    HGB 14.7 06/28/2018    HCT 41.5 06/28/2018    MCV 89 06/28/2018     06/28/2018       Results for orders placed or performed during the hospital encounter of 06/28/18   Basic Metabolic Panel   Result Value Ref Range    Sodium 137 136 - 145 mmol/L    Potassium 4.0 3.5 - 5.0 mmol/L    Chloride 104 98 - 107 mmol/L    CO2 22 22 - 31 mmol/L    Anion Gap, Calculation 11 5 - 18 mmol/L    Glucose 142 (H) 70 - 125 mg/dL    Calcium 9.7 8.5 - 10.5 mg/dL    BUN 17 8 - 28 mg/dL    Creatinine 1.29 0.70 - 1.30 mg/dL    GFR MDRD Af Amer >60 >60 mL/min/1.73m2    GFR MDRD Non Af Amer 54 (L) >60 mL/min/1.73m2           No results found for: HGBA1C  Vitamin D, Total (25-Hydroxy)   Date Value Ref Range Status   07/02/2018 31.2 30.0 - 80.0 ng/mL Final     Lab Results   Component Value Date    [id number] 769 07/02/2018       ASSESSMENT/PLAN:  1. HTN (hypertension)    2. Embolic stroke involving left middle cerebral artery (H)    3. COPD (chronic obstructive pulmonary disease) (H)    4. Advanced directives, counseling/discussion    5.  Constipation        Pt with HTN, goal should be lower, we will increase Metoporol to 75 mg po daily and f/u with am labs, CBC, BMP, Mg and Vit D.  CVA: In rehab for ongoing strengthening, safety and medical management.  COPD: Stable, has prn inhaler if needed, see med list.  Constipation: Schedule daily senna s 2 po daily, Doculax supp after his lunch today  POLST reviewed, Full code status.    Electronically signed by:  Aleja Hernández CNP  This progress note was completed using Dragon software and there may be grammatical errors.      40 minutes spent of which greater than 75 % was face to face communication with the patient about above plan of care

## 2021-06-19 NOTE — PROGRESS NOTES
[facility]      NAME:  Al Dixon             :  1940    MRN: [id number]    CODE STATUS:  FULL CODE    FACILITY: [facility] [[id number]]         CHIEF COMPLAIN/REASON FOR VISIT:  Chief Complaint   Patient presents with     Problem Visit     hypona       HISTORY OF PRESENT ILLNESS: Al Dixon is a 77 y.o. male being seen at the request of the nurses for POLST review and medical condition review. Pt admitted to the TCU after a stay at [facility].  Per patient's report he originally presented to [facility] after he presented with weakness and speech impairment.  Imaging revealed that he had a left middle cerebral artery embolic stroke probable cause cardiac source.  The patient he reports he was transferred to [facility].  He underwent tPA and did well with no residual weakness since then.  He has quite a hefty handgrip.  He had no ICH on his MRI so ASA was resumed.  Is also on Plavix per neurology recommendation and that will be in 90 days and we will monitor.  He is seen today in his room to review his pulse and he wishes to be full code.  He does have a bit of a left side to on his eye however as mentioned residual strength and  are equal very strong denies weakness in legs.  Of note he has been having several high blood pressures as well today is 185/96, he has been systolically over 200 in the past day or so as well.  He is on metoprolol tartrate 25 mg take 2 tablets twice daily, in lieu of higher blood pressures and going to increase that to 75 mg p.o. twice daily and staff will continue to monitor.      Today he is seen sitting up in his bed, he reports another episode of dizziness with subsequent nausea lasting 20-30 minutes after getting up and looking into the mirror and lites in the bathroom, he believes that this may be the cause for his dizziness.  He has been evaluated postural hypotension, and I discussed with PT a Lyric-Hallhilaria  maneuver, which is artery been performed last week on 7/16 which was positive and treated.  He reports one episode of dizziness back in April prior to his stroke that was elicited by driving on an incline, however his symptoms have greatly exacerbated after his stroke.  He is otherwise feeling well, he is alert and oriented, he denies chest pain, shortness of breath, constipation or diarrhea, urinary symptoms.    Allergies   Allergen Reactions     Augmentin [Amoxicillin-Pot Clavulanate]      GI upset     Dulera [Mometasone-Formoterol]      shaking     Penicillins      Prednisone      Symbicort [Budesonide-Formoterol]      bachache   :     Current Outpatient Prescriptions   Medication Sig     acetaminophen (TYLENOL) 325 MG tablet Take 650 mg by mouth every 6 (six) hours as needed for pain.     albuterol (PROVENTIL HFA;VENTOLIN HFA) 90 mcg/actuation inhaler Inhale 1-2 puffs every 4 (four) hours as needed for wheezing.      aspirin 81 MG tablet Take 81 mg by mouth daily.     atorvastatin (LIPITOR) 40 MG tablet Take 40 mg by mouth at bedtime.     clopidogrel (PLAVIX) 75 mg tablet Take 75 mg by mouth daily.     metoprolol tartrate (LOPRESSOR) 25 MG tablet TAKE 3 TABLETS (75  MG) BY MOUTH TWICE DAILY     psyllium husk, with sugar, (FIBER, PSYLLIUM HUSK/SUGAR,) 3.4 gram/7 gram Powd Take by mouth. 1 scoop daily     ranitidine (ZANTAC) 150 MG tablet Take 150 mg by mouth 2 (two) times a day.         REVIEW OF SYSTEMS:    Currently, no fever, chills, or rigors. Does not have any visual or hearing problems. Denies any chest pain, headaches, palpitations, lightheadedness, shortness of breath, or cough. Appetite is good. Denies any GERD symptoms. Denies any difficulty with swallowing, nausea, or vomiting.  Denies any abdominal pain, diarrhea, has constipation. Denies any urinary symptoms. No insomnia. No active bleeding. No rash.  Fatigue.  Endorses dizziness with nausea that lasts approximately 20-30 minutes per  episode.    PHYSICAL EXAMINATION:  Vitals:    07/25/18 1234   BP: 160/82   Pulse: 68   Resp: 18   Temp: 97.1  F (36.2  C)   SpO2: 93%   Weight: 175 lb (79.4 kg)         GENERAL: Awake, Alert, oriented x3, not in any form of acute distress, answers questions appropriately, follows simple commands, conversant  HEENT: Head is normocephalic with normal hair distribution. No evidence of trauma. Ears: No acute purulent discharge. Eyes: Conjunctivae pink with no scleral jaundice. Nose: Normal   mucosa and septum.   CHEST: No tenderness or deformity.  LUNG: Clear to auscultation with good chest expansion. There are no crackles or wheezes, normal AP diameter.  BACK: No kyphosis of the thoracic spine. Symmetric.  CVS: There is good S1  S2,  rhythm is regular.  ABDOMEN: Globular and soft, nontender to palpation.  EXTREMITIES: Atraumatic. Full range of motion on both upper and lower extremities, there is no tenderness to palpation, no pedal edema.  SKIN: Warm and dry, no erythema noted, no rashes or lesions.  NEUROLOGICAL: The patient is oriented to person, place and time. Strength and sensation are grossly intact. Face is symmetric.      LABS:    Lab Results   Component Value Date    WBC 7.2 07/11/2018    HGB 14.3 07/11/2018    HCT 41.3 07/11/2018    MCV 91 07/11/2018     07/11/2018       Results for orders placed or performed in visit on 07/11/18   Basic Metabolic Panel   Result Value Ref Range    Sodium 130 (L) 136 - 145 mmol/L    Potassium 3.6 3.5 - 5.0 mmol/L    Chloride 96 (L) 98 - 107 mmol/L    CO2 24 22 - 31 mmol/L    Anion Gap, Calculation 10 5 - 18 mmol/L    Glucose 105 70 - 125 mg/dL    Calcium 9.3 8.5 - 10.5 mg/dL    BUN 22 8 - 28 mg/dL    Creatinine 1.20 0.70 - 1.30 mg/dL    GFR MDRD Af Amer >60 >60 mL/min/1.73m2    GFR MDRD Non Af Amer 59 (L) >60 mL/min/1.73m2           No results found for: HGBA1C  Vitamin D, Total (25-Hydroxy)   Date Value Ref Range Status   07/11/2018 32.7 30.0 - 80.0 ng/mL Final     Lab  Results   Component Value Date    [id number] 769 07/02/2018       ASSESSMENT/PLAN:  1. Hyponatremia    2. Dizziness, nonspecific      1. BMP on 7/12 with NA of 130; we will recheck sodium to ensure it is stable.   2. Dizziness is longstanding since post CVA. I discussed with therapy and he has been evaled and treated for left sided BPPV. PT reports he has occasionally been unable to complete therapy due to dizziness and subsequent nausea that lasts for 20-30 minutes after an episode.  Both PT and patient think this may be triggered by vision and bright lights, as he states he felt nauseous and dizziness for 20 minutes after standing up and turning on the bathroom light this morning.  Therapy is going to attempt next session with sunglasses on to see if this alleviates any vision triggers.  We will also consider meclizine for dizziness as his symptoms are quite profound.  Also possibly outpatient neuro-ophthalmology consult.  I will reevaluate his symptoms on Friday.    Electronically signed by: Aleja Hernández CNP   In conjunction with   Anna Vinson  This progress note was completed using Dragon software and there may be grammatical errors.      35 minutes spent of which greater than 75 % was face to face communication with the patient and PT about above plan of care

## 2021-06-19 NOTE — PROGRESS NOTES
[facility]      Code Status:  FULL CODE  Visit Type: Review Of Multiple Medical Conditions     Facility:  [facility] [[id number]]           History of Present Illness: Al Dixon is a 77 y.o. male who was admitted to the TCU as a transfer from the hospital.  This is an elderly gentleman who presented with weakness and speech impairment to the hospital.  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  He underwent TPA and is doing quite well with no significant residual weakness  MRI showed no ICH so aspirin was resumed.  Left internal carotid artery was also opened on the CTA  He has other medical problems including COPD coronary disease hypertension which were stable  As per neurology's recommendation he will be on aspirin and Plavix for 90 days after which he will resume just Plavix daily they also recommended optimization of his medical management including better control of blood pressure ;cholesterol  His metoprolol was increased to 75 mg twice daily due to elevated blood pressures.  He has been reporting some dizziness and lightheadedness  Concerned about his facial drooping on the left side along with significant gait instability and balance impairments.  Recheck labs of shown hyponatremia with a sodium 130 but otherwise appeared to be stable.  I did have a discussion with his physical therapist who believes that this is not orthostatic drop it was related most likely to his CVA  He is working in therapy and doing well however continues to complain of some heaviness of his face  He continues to have dizzy episodes and has felt this to be related to vision.  He was seen again today at his request regarding some concerns about discharge planning is hoping to go home by the end of this week.  He has been especially concerned with the fact that he lives with his wife and will not be able to drive  for some time  Past Medical History:   Diagnosis Date     COPD (chronic obstructive pulmonary disease) (H)      Embolic stroke involving left middle cerebral artery (H)      Hypertension      Renal insufficiency      Past Surgical History:   Procedure Laterality Date     BACK SURGERY      L5-S1      CHOLECYSTECTOMY       RI THROMBOENDARTECTMY NECK,NECK INCIS Right 6/26/2014    Procedure: CAROTID ENDARTERECTOMY, RIGHT;  Surgeon: Tr Nolan MD;  Location: [facility] Main OR;  Service: General     No family history on file.  Social History     Social History     Marital status:      Spouse name: N/A     Number of children: N/A     Years of education: N/A     Occupational History     Not on file.     Social History Main Topics     Smoking status: Former Smoker     Smokeless tobacco: Not on file     Alcohol use No     Drug use: No     Sexual activity: Not on file     Other Topics Concern     Not on file     Social History Narrative     Current Outpatient Prescriptions   Medication Sig Dispense Refill     acetaminophen (TYLENOL) 325 MG tablet Take 650 mg by mouth every 6 (six) hours as needed for pain.       albuterol (PROVENTIL HFA;VENTOLIN HFA) 90 mcg/actuation inhaler Inhale 1-2 puffs every 4 (four) hours as needed for wheezing.        aspirin 81 MG tablet Take 81 mg by mouth daily.       atorvastatin (LIPITOR) 40 MG tablet Take 40 mg by mouth at bedtime.       clopidogrel (PLAVIX) 75 mg tablet Take 75 mg by mouth daily.       metoprolol tartrate (LOPRESSOR) 25 MG tablet TAKE 3 TABLETS (75  MG) BY MOUTH TWICE DAILY  0     psyllium husk, with sugar, (FIBER, PSYLLIUM HUSK/SUGAR,) 3.4 gram/7 gram Powd Take by mouth. 1 scoop daily       ranitidine (ZANTAC) 150 MG tablet Take 150 mg by mouth 2 (two) times a day.       No current facility-administered medications for this visit.      Allergies   Allergen Reactions     Augmentin [Amoxicillin-Pot Clavulanate]      GI upset     Dulera [Mometasone-Formoterol]       shaking     Penicillins      Prednisone      Symbicort [Budesonide-Formoterol]      bachache       Review of Systems:    Constitutional: Negative.  Negative for fever, chills, HAS activity change, appetite change and fatigue.   HENT: Negative for congestion and facial swelling.    Eyes: Negative for photophobia, redness and visual disturbance.   Respiratory: Negative for cough and chest tightness.    Cardiovascular: Negative for chest pain, palpitations and leg swelling.   Gastrointestinal: Negative for nausea, diarrhea, he has constipation, NO blood in stool and abdominal distention.   Genitourinary: Negative.    Musculoskeletal: Negative.    Reporting left-sided weakness with speech difficulties  Skin: Negative.    Neurological: Negative for dizziness, tremors, syncope, HAS weakness, light-headedness and headaches.   Hematological: Does not bruise/bleed easily.   Psychiatric/Behavioral: Negative.      Patient 117/70 pulse 80    Physical Exam:    GENERAL: no acute distress. Cooperative in conversation.   HEENT: pupils are equal, round and reactive. Oral mucosa is moist and intact.  RESP:Chest symmetric. Regular respiratory rate. No stridor.  CVS: S1S2  ABD: Nondistended, soft.  EXTREMITIES: No lower extremity edema.  Strength is equal in both upper and lower extremities with no focal deficit noted he does have some speech difficulties  NEURO: non focal. Alert and oriented x3.   PSYCH: within normal limits. No depression or anxiety.  SKIN: warm dry intact     Labs:    Recent Results (from the past 240 hour(s))   Sodium   Result Value Ref Range    Sodium 134 (L) 136 - 145 mmol/L     Assessment/Plan:    Left middle cerebral artery embolic stroke related to cardiac sources  Left internal carotid artery was open on the CTA  He underwent TPN .is doing quite well.  MRI showed no intracranial hemorrhage aspirin has been resumed on discharge based on neurology's recommendation is on aspirin and Plavix for 90 days  subsequently he will  Just take Plavix indefinitely from then on.  Right-sided weakness with aphasia.  Currently seems to be communicating well and has no significant weakness he is weightbearing as tolerated  HTN-blood pressures are significantly elevated.  He is on metoprolol monitor and adjust medications.  Dosage increased to 75 twice daily  COPD-patient only once Spiriva along with albuterol as needed.  Also takes an oral steroid inhaler  CKD-monitor BMPs in the TCU.  CONSTIPATION-he is requesting prune juice he has not had a BM for 4 days  WEAKNESS-discharged to the TCU for strengthening and rehab denying any significant weakness but does have speech difficulties  Continue with his care plan monitor blood pressures closely and adjust blood pressure medications as needed  He continues to have significant difficulty with balance impairments though he does not have any specific weakness per se and is working in therapy.  Denying any worsening aphasia or speech difficulty he is doing well per ST  Recheck labs are stable he continues to have persistent dizziness and lightheadedness but denies it is positional orthostatic blood pressures however did not show any drop labs are mostly stable other than the low sodium.  Suspect this is most likely due to recent CVA and I did talk to his therapist and they have suspected the same thing so continue to monitor closely  discharge planning reviewed.  He had several concerns about going home picking up his medication the fact that he will not be driving.  I have again asked  to assist him with setting up additional resources for him in the community.  Total time spent was 35 minutes, more than half of it was in face-to-face counseling regarding disease state, treatment, side effects, documentation, review of clinical data and coordination of care  Electronically signed by: JACLYN Reyes  This progress note was completed using Dragon software and there may be  grammatical errors.

## 2021-06-19 NOTE — PROGRESS NOTES
[facility]      Code Status:  FULL CODE  Visit Type: Review Of Multiple Medical Conditions     Facility:  [facility] [place] [[id number]]           History of Present Illness: Al Dixon is a 77 y.o. male who was admitted to the TCU as a transfer from the hospital.  This is an elderly gentleman who presented with weakness and speech impairment to the hospital.  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  He underwent TPA and is doing quite well with no significant residual weakness  MRI showed no ICH so aspirin was resumed.  Left internal carotid artery was also opened on the CTA  He has other medical problems including COPD coronary disease hypertension which were stable  As per neurology's recommendation he will be on aspirin and Plavix for 90 days after which he will resume just Plavix daily they also recommended optimization of his medical management including better control of blood pressure ;cholesterol  His metoprolol was increased to 75 mg twice daily due to elevated blood pressures.  He has been reporting some dizziness and lightheadedness  Concerned about his facial drooping on the left side along with significant gait instability and balance impairments.  Recheck labs of shown hyponatremia with a sodium 130 but otherwise appeared to be stable.  I did have a discussion with his physical therapist who believes that this is not orthostatic drop it was related most likely to his CVA  He is working in therapy and doing well however continues to complain of some heaviness of his face  He continues to have dizzy episodes and has felt this to be related to vision  Past Medical History:   Diagnosis Date     COPD (chronic obstructive pulmonary disease) (H)      Embolic stroke involving left middle cerebral artery (H)      Hypertension      Renal insufficiency      Past Surgical History:   Procedure  Laterality Date     BACK SURGERY      L5-S1      CHOLECYSTECTOMY       OK THROMBOENDARTECTMY NECK,NECK INCIS Right 6/26/2014    Procedure: CAROTID ENDARTERECTOMY, RIGHT;  Surgeon: Tr Nolan MD;  Location: [facility] Main OR;  Service: General     No family history on file.  Social History     Social History     Marital status:      Spouse name: N/A     Number of children: N/A     Years of education: N/A     Occupational History     Not on file.     Social History Main Topics     Smoking status: Former Smoker     Smokeless tobacco: Not on file     Alcohol use No     Drug use: No     Sexual activity: Not on file     Other Topics Concern     Not on file     Social History Narrative     Current Outpatient Prescriptions   Medication Sig Dispense Refill     acetaminophen (TYLENOL) 325 MG tablet Take 650 mg by mouth every 6 (six) hours as needed for pain.       albuterol (PROVENTIL HFA;VENTOLIN HFA) 90 mcg/actuation inhaler Inhale 1-2 puffs every 4 (four) hours as needed for wheezing.        aspirin 81 MG tablet Take 81 mg by mouth daily.       atorvastatin (LIPITOR) 40 MG tablet Take 40 mg by mouth at bedtime.       clopidogrel (PLAVIX) 75 mg tablet Take 75 mg by mouth daily.       metoprolol tartrate (LOPRESSOR) 25 MG tablet TAKE 3 TABLETS (75  MG) BY MOUTH TWICE DAILY  0     psyllium husk, with sugar, (FIBER, PSYLLIUM HUSK/SUGAR,) 3.4 gram/7 gram Powd Take by mouth. 1 scoop daily       ranitidine (ZANTAC) 150 MG tablet Take 150 mg by mouth 2 (two) times a day.       No current facility-administered medications for this visit.      Allergies   Allergen Reactions     Augmentin [Amoxicillin-Pot Clavulanate]      GI upset     Dulera [Mometasone-Formoterol]      shaking     Penicillins      Prednisone      Symbicort [Budesonide-Formoterol]      bachache       Review of Systems:    Constitutional: Negative.  Negative for fever, chills, HAS activity change, appetite change and fatigue.   HENT: Negative for  congestion and facial swelling.    Eyes: Negative for photophobia, redness and visual disturbance.   Respiratory: Negative for cough and chest tightness.    Cardiovascular: Negative for chest pain, palpitations and leg swelling.   Gastrointestinal: Negative for nausea, diarrhea, he has constipation, NO blood in stool and abdominal distention.   Genitourinary: Negative.    Musculoskeletal: Negative.    Reporting left-sided weakness with speech difficulties  Skin: Negative.    Neurological: Negative for dizziness, tremors, syncope, HAS weakness, light-headedness and headaches.   Hematological: Does not bruise/bleed easily.   Psychiatric/Behavioral: Negative.      Vitals:    07/26/18 1115   BP: 152/68   Pulse: 66   Temp: 98  F (36.7  C)       Physical Exam:    GENERAL: no acute distress. Cooperative in conversation.   HEENT: pupils are equal, round and reactive. Oral mucosa is moist and intact.  RESP:Chest symmetric. Regular respiratory rate. No stridor.  CVS: S1S2  ABD: Nondistended, soft.  EXTREMITIES: No lower extremity edema.  Strength is equal in both upper and lower extremities with no focal deficit noted he does have some speech difficulties  NEURO: non focal. Alert and oriented x3.   PSYCH: within normal limits. No depression or anxiety.  SKIN: warm dry intact     Labs:    Recent Results (from the past 240 hour(s))   Sodium   Result Value Ref Range    Sodium 134 (L) 136 - 145 mmol/L     Assessment/Plan:    Left middle cerebral artery embolic stroke related to cardiac sources  Left internal carotid artery was open on the CTA  He underwent TPN .is doing quite well.  MRI showed no intracranial hemorrhage aspirin has been resumed on discharge based on neurology's recommendation is on aspirin and Plavix for 90 days subsequently he will  Just take Plavix indefinitely from then on.  Right-sided weakness with aphasia.  Currently seems to be communicating well and has no significant weakness he is weightbearing as  tolerated  HTN-blood pressures are significantly elevated.  He is on metoprolol monitor and adjust medications.  Dosage increased to 75 twice daily  COPD-patient only once Spiriva along with albuterol as needed.  Also takes an oral steroid inhaler  CKD-monitor BMPs in the TCU.  CONSTIPATION-he is requesting prune juice he has not had a BM for 4 days  WEAKNESS-discharged to the TCU for strengthening and rehab denying any significant weakness but does have speech difficulties  Continue with his care plan monitor blood pressures closely and adjust blood pressure medications as needed  He continues to have significant difficulty with balance impairments though he does not have any specific weakness per se and is working in therapy.  Denying any worsening aphasia or speech difficulty he is doing well per ST  Recheck labs are stable he continues to have persistent dizziness and lightheadedness but denies it is positional orthostatic blood pressures however did not show any drop labs are mostly stable other than the low sodium.  Suspect this is most likely due to recent CVA and I did talk to his therapist and they have suspected the same thing so continue to monitor closely  discharge planning reviewed and advise df/u opthalmology and also advised not to drive.  Apply for [employer] mobility advised to pt also  Total time spent was 35 minutes, more than half of it was in face-to-face counseling regarding disease state, treatment, side effects, documentation, review of clinical data and coordination of care  Electronically signed by: JACLYN Reyes  This progress note was completed using Dragon software and there may be grammatical errors.

## 2021-06-19 NOTE — PROGRESS NOTES
[facility]      Code Status:  FULL CODE  Visit Type: Review Of Multiple Medical Conditions     Facility:  [facility] [[id number]]           History of Present Illness: Al Dixon is a 77 y.o. male who was admitted to the TCU as a transfer from the hospital.  This is an elderly gentleman who presented with weakness and speech impairment to the hospital.  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  He underwent TPA and is doing quite well with no significant residual weakness  MRI showed no ICH so aspirin was resumed.  Left internal carotid artery was also opened on the CTA  He has other medical problems including COPD coronary disease hypertension which were stable  As per neurology's recommendation he will be on aspirin and Plavix for 90 days after which he will resume just Plavix daily they also recommended optimization of his medical management including better control of blood pressure ;cholesterol  His metoprolol was increased to 75 mg twice daily due to elevated blood pressures.  He has been reporting some dizziness and lightheadedness will check some orthostatics is not sure if it is dropping but if he stands up suddenly he states his blood pressures drop and he feels dizzy.  He is also taking some Metamucil from home.  Concerned about his facial drooping on the left side along with significant gait instability and balance impairments    Past Medical History:   Diagnosis Date     COPD (chronic obstructive pulmonary disease) (H)      Embolic stroke involving left middle cerebral artery (H)      Hypertension      Renal insufficiency      Past Surgical History:   Procedure Laterality Date     BACK SURGERY      L5-S1      CHOLECYSTECTOMY       AK THROMBOENDARTECTMY NECK,NECK INCIS Right 6/26/2014    Procedure: CAROTID ENDARTERECTOMY, RIGHT;  Surgeon: Tr Nolan MD;  Location: [facility]  Main OR;  Service: General     No family history on file.  Social History     Social History     Marital status:      Spouse name: N/A     Number of children: N/A     Years of education: N/A     Occupational History     Not on file.     Social History Main Topics     Smoking status: Former Smoker     Smokeless tobacco: Not on file     Alcohol use No     Drug use: No     Sexual activity: Not on file     Other Topics Concern     Not on file     Social History Narrative     Current Outpatient Prescriptions   Medication Sig Dispense Refill     acetaminophen (TYLENOL) 325 MG tablet Take 650 mg by mouth every 6 (six) hours as needed for pain.       albuterol (PROVENTIL HFA;VENTOLIN HFA) 90 mcg/actuation inhaler Inhale 1-2 puffs every 4 (four) hours as needed for wheezing.        aspirin 81 MG tablet Take 81 mg by mouth daily.       atorvastatin (LIPITOR) 40 MG tablet Take 40 mg by mouth at bedtime.       clopidogrel (PLAVIX) 75 mg tablet Take 75 mg by mouth daily.       metoprolol tartrate (LOPRESSOR) 25 MG tablet TAKE 3 TABLETS (75  MG) BY MOUTH TWICE DAILY  0     psyllium husk, with sugar, (FIBER, PSYLLIUM HUSK/SUGAR,) 3.4 gram/7 gram Powd Take by mouth. 1 scoop daily       ranitidine (ZANTAC) 150 MG tablet Take 150 mg by mouth 2 (two) times a day.       No current facility-administered medications for this visit.      Allergies   Allergen Reactions     Augmentin [Amoxicillin-Pot Clavulanate]      GI upset     Dulera [Mometasone-Formoterol]      shaking     Penicillins      Prednisone      Symbicort [Budesonide-Formoterol]      bachache         Review of Systems:    Constitutional: Negative.  Negative for fever, chills, HAS activity change, appetite change and fatigue.   HENT: Negative for congestion and facial swelling.    Eyes: Negative for photophobia, redness and visual disturbance.   Respiratory: Negative for cough and chest tightness.    Cardiovascular: Negative for chest pain, palpitations and leg  swelling.   Gastrointestinal: Negative for nausea, diarrhea, he has constipation, NO blood in stool and abdominal distention.   Genitourinary: Negative.    Musculoskeletal: Negative.    Reporting left-sided weakness with speech difficulties  Skin: Negative.    Neurological: Negative for dizziness, tremors, syncope, weakness, light-headedness and headaches.   Hematological: Does not bruise/bleed easily.   Psychiatric/Behavioral: Negative.      Vitals:    07/12/18 1648   BP: 160/77   Pulse: 82   Temp: 98  F (36.7  C)       Physical Exam:    GENERAL: no acute distress. Cooperative in conversation.   HEENT: pupils are equal, round and reactive. Oral mucosa is moist and intact.  RESP:Chest symmetric. Regular respiratory rate. No stridor.  CVS: S1S2  ABD: Nondistended, soft.  EXTREMITIES: No lower extremity edema.  Strength is equal in both upper and lower extremities with no focal deficit noted he does have some speech difficulties  NEURO: non focal. Alert and oriented x3.   PSYCH: within normal limits. No depression or anxiety.  SKIN: warm dry intact       Labs:    Recent Results (from the past 240 hour(s))   HM2(CBC w/o Differential)   Result Value Ref Range    WBC 7.2 4.0 - 11.0 thou/uL    RBC 4.53 4.40 - 6.20 mill/uL    Hemoglobin 14.3 14.0 - 18.0 g/dL    Hematocrit 41.3 40.0 - 54.0 %    MCV 91 80 - 100 fL    MCH 31.6 27.0 - 34.0 pg    MCHC 34.6 32.0 - 36.0 g/dL    RDW 12.0 11.0 - 14.5 %    Platelets 238 140 - 440 thou/uL    MPV 9.7 8.5 - 12.5 fL   Basic Metabolic Panel   Result Value Ref Range    Sodium 130 (L) 136 - 145 mmol/L    Potassium 3.6 3.5 - 5.0 mmol/L    Chloride 96 (L) 98 - 107 mmol/L    CO2 24 22 - 31 mmol/L    Anion Gap, Calculation 10 5 - 18 mmol/L    Glucose 105 70 - 125 mg/dL    Calcium 9.3 8.5 - 10.5 mg/dL    BUN 22 8 - 28 mg/dL    Creatinine 1.20 0.70 - 1.30 mg/dL    GFR MDRD Af Amer >60 >60 mL/min/1.73m2    GFR MDRD Non Af Amer 59 (L) >60 mL/min/1.73m2   Magnesium   Result Value Ref Range     Magnesium 2.1 1.8 - 2.6 mg/dL     Hemoglobin 13 white count 8.7 platelets 206.  BUN 15 creatinine 1.2  MRI brain showed a 3.5 and 2 mm acute infarct in the left lateral medullary with chronic cortical infarct in the left superior parietal lobule.  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  MRI showed no intracranial hemorrhage aspirin has been resumed on discharge based on neurology's recommendation is on aspirin and Plavix for 90 days subsequently he will  Just take Plavix indefinitely from then on.  Right-sided weakness with aphasia.  Currently seems to be communicating well and has no significant weakness he is weightbearing as tolerated  HTN-blood pressures are significantly elevated.  He is on metoprolol monitor and adjust medications.  Dosage increased to 75 twice daily  COPD-patient only once Spiriva along with albuterol as needed.  Also takes an oral steroid inhaler  CKD-monitor BMPs in the TCU.  CONSTIPATION-he is requesting prune juice he has not had a BM for 4 days  WEAKNESS-discharged to the TCU for strengthening and rehab denying any significant weakness but does have speech difficulties  Continue with his care plan monitor blood pressures closely and adjust blood pressure  medications as needed  Recheck labs ordered from yesterday are still pending.  At his request I am letting it self administer his Metamucil.  We will also be checking orthostatic blood pressure since he is complaining of dizziness lightheadedness especially when he stands up.  He continues to have significant difficulty with balance impairments though he does not have any specific weakness per se and is working in therapy.  Denying any worsening aphasia or speech difficulty still  Total time spent was 35 minutes, more than half of it was in face-to-face counseling regarding disease state, treatment, side effects, documentation, review of clinical data and coordination of care    Electronically signed by: JACLYN Reyes  This progress note was completed using Dragon software and there may be grammatical errors.

## 2021-06-24 NOTE — TELEPHONE ENCOUNTER
Patient called back after receiving a call from [facility] in regards to scheduling 1 year F/U and US. Patient stated he would like to see if he can continue care with Dr. RAFFI Nolan. Provided phone number to [facility] so he can schedule appt.

## 2021-06-26 NOTE — PROGRESS NOTES
Progress Notes by Marielos Vinson CNP at 2018 10:18 AM     Author: Marielos Vinson CNP Service: -- Author Type: --    Filed: 2018  1:12 PM Encounter Date: 2018 Status: Attested    : Marielos Vinson CNP (Nurse Practitioner)    Related Notes: Original Note by Marielos Vinson CNP (Nurse Practitioner) filed at 2018 12:21 PM    Cosigner: Bethany Worley MBBS at 2018  3:47 PM    Attestation signed by Bethany Worley MBBS at 2018  3:47 PM    Agree with discharge plan                [facility]      NAME:  Al Dixon             :  1940  MRN: [id number]  CODE STATUS:  FULL CODE    VISIT TYPE: DISCHARGE SUMMARY  FACILYTY: [facility] [[id number]]                    Hospital Course: [facility] July: -10/2018.     PRIMARY CARE PROVIDER: Vania Green PA-C    DISCHARGE DIAGNOSIS:      1. Embolic stroke involving left middle cerebral artery (H)    2. Essential hypertension    3. Dizziness, nonspecific         DISCHARGE MEDICATIONS:         Medication List          These changes are accurate as of 18 10:28 AM.  If you have any questions, ask your nurse or doctor.               CONTINUE taking these medications          acetaminophen 325 MG tablet   Commonly known as:  TYLENOL       albuterol 90 mcg/actuation inhaler   Commonly known as:  PROAIR HFA;PROVENTIL HFA;VENTOLIN HFA       aspirin 81 MG EC tablet       atorvastatin 40 MG tablet   Commonly known as:  LIPITOR       clopidogrel 75 mg tablet   Commonly known as:  PLAVIX       FIBER (PSYLLIUM HUSK/SUGAR) 3.4 gram/7 gram Powd   Generic drug:  psyllium husk (with sugar)       metoprolol tartrate 25 MG tablet   Commonly known as:  LOPRESSOR       ranitidine 150 MG tablet   Commonly known as:  ZANTAC             HISTORY OF PRESENT ILLNESS: Al Dixon is a 77 y.o. male admitted to the TCU after a stay at [facility].  Per patient's report he originally presented  to [facility] after he presented with weakness and speech impairment.  Imaging revealed that he had a left middle cerebral artery embolic stroke probable cause cardiac source.  The patient he reports he was transferred to [facility].  He underwent tPA and did well with no residual weakness since then.  He has quite a hefty handgrip.  He had no ICH on his MRI so ASA was resumed.  Is also on Plavix per neurology recommendation and that will be on 90 days and we will monitor.  He is seen today in his room to review his pulse and he wishes to be full code.  He does have a bit of a left side to on his eye however as mentioned residual strength and  are equal very strong denies weakness in legs. He is on metoprolol tartrate 75mg two times a day.   He is seen walking the hallway with four wheeled walker.     SKILLED NURSING FACILITY COURSE:  During this TCU stay, patient completed all anticipated goals of therapy.      PHYSICAL EXAMINATION:    Vitals:    08/01/18 1019   BP: 117/70   Pulse: 66   Resp: 18   Temp: (!) 96.1  F (35.6  C)   SpO2: 97%   Weight: 174 lb 3.2 oz (79 kg)       Physical Exam   General appearance: alert, appears stated age and cooperative.   Head: Normocephalic, without obvious abnormality, atraumatic, Eyes: sclera anicteric.  Lungs: respirations without effort, LS CTA; no wheezing or rales.   Cardiovascular: S1, S2. Regular rate and rhythm.   ABDOMEN: Globular and soft, non tender.    Extremities: extremities normal, atraumatic, no cyanosis or edema  Skin: Skin color, texture, turgor normal. No rashes or lesions  Neurologic: oriented. No focal deficits.   Psych: interacts well with caregivers, exhibits logical thought processes and connections, pleasant    LABS:  All labs reviewed in the nursing home record. Sodium was being followed and is most recently 134.       DISCHARGE PLAN: I certify that this patient is under Dr. Worley's care, seen by the NP, and had a face-to-face encounter that  meets the physician face-to-face encounter requirements on 8/1/2018.  The encounter was in whole, or part related to the primary reason for home health.  The Patient is homebound due to: dizziness and instability; it is taxing and it will take a considerable amount of effort for patient to leave the home.  He is dependent on others for transportation.  The patient is confined to her home and needs PT,OT, RN.  The patient has been under the care of Dr. Worley/NP and Dr. Worley  initiated the establishment of the plan of care.        Patient to be followed by home care for physical therapy to eval and treat for strengthening, balance, endurance, and safety with mobility, and ambulation.  Patient to be followed by home care for occupational therapy to eval and treat for strengthening, ADL needs, adaptive equipment, and safety.  Patient to be followed by home care for nursing services for medication set up and teaching, symptom and disease processes monitoring and education.    Planned discharge.  All therapy goals have been met.  Family will assist with discharge and transportation.      Patient will follow up with PCP within 7 days after discharge for medication mangagment and appropriate lab studies.  Recommend follow up with PCP for bp monitoring.     Equipment:  Ok for front wheeled walker/Four wheeled walker for use after discharge    Patient received a hard script for:  NA      Electronically signed by:  Anna Vinson  This progress note was completed using Dragon software and there may be grammatical errors.      For documentation purposes, chart review, medication management, and discharge coordination of care was greater than 35 minutes

## 2024-06-17 PROBLEM — Z71.89 ADVANCED DIRECTIVES, COUNSELING/DISCUSSION: Status: RESOLVED | Noted: 2018-07-11 | Resolved: 2024-01-01
